# Patient Record
Sex: FEMALE | URBAN - METROPOLITAN AREA
[De-identification: names, ages, dates, MRNs, and addresses within clinical notes are randomized per-mention and may not be internally consistent; named-entity substitution may affect disease eponyms.]

---

## 2021-08-16 ENCOUNTER — OFFICE VISIT (OUTPATIENT)
Dept: URBAN - METROPOLITAN AREA CLINIC 13 | Facility: CLINIC | Age: 66
End: 2021-08-16
Payer: OTHER GOVERNMENT

## 2021-08-16 DIAGNOSIS — H35.373 PUCKERING OF MACULA, BILATERAL: Primary | ICD-10-CM

## 2021-08-16 DIAGNOSIS — Z96.1 PRESENCE OF INTRAOCULAR LENS: ICD-10-CM

## 2021-08-16 DIAGNOSIS — H20.9 UVEITIS: ICD-10-CM

## 2021-08-16 DIAGNOSIS — H25.12 AGE-RELATED NUCLEAR CATARACT, LEFT EYE: ICD-10-CM

## 2021-08-16 PROCEDURE — 92004 COMPRE OPH EXAM NEW PT 1/>: CPT | Performed by: OPHTHALMOLOGY

## 2021-08-16 PROCEDURE — 92134 CPTRZ OPH DX IMG PST SGM RTA: CPT | Performed by: OPHTHALMOLOGY

## 2021-08-16 ASSESSMENT — INTRAOCULAR PRESSURE
OD: 12
OS: 14

## 2021-08-16 NOTE — IMPRESSION/PLAN
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 Right. Plan: Inactive, but with residual sclerotic blood vessels. 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 This is mimicking a retinal detachment, but fortunately no RD seen today. D/w patient and reassured.

## 2021-08-16 NOTE — IMPRESSION/PLAN
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 OD: s/p PPV/MP in 2018 (SI)
OS: mild OCT OU = no SRF/IRF s/p peel OD mild ERM OS Plan: There is an epiretinal membrane (ERM), but the patient is doing well with their current vision, and thus we will observe the ERM for now. We did discuss the natural history as well as the risks and benefits of observation vs. vitrectomy surgery. The patient will call if they experience decreased vision or increased metamorphopsia. 

6m OCT OU